# Patient Record
Sex: MALE | Race: WHITE | URBAN - METROPOLITAN AREA
[De-identification: names, ages, dates, MRNs, and addresses within clinical notes are randomized per-mention and may not be internally consistent; named-entity substitution may affect disease eponyms.]

---

## 2018-07-25 ENCOUNTER — TELEPHONE (OUTPATIENT)
Dept: CARDIOLOGY CLINIC | Age: 58
End: 2018-07-25

## 2018-07-25 NOTE — TELEPHONE ENCOUNTER
Called patient to see if he had ever seen [facility] at [facility] (looks like he did in care everywhere ). He said no, his previous cardiologist moved to [facility] and he doesn't remember his name but it wasn't Attari. He is seeing his pcp today and thinks he can get the clearance he needs for his surgery tomorrow. Advised him to call back if he needs an appt . If pcp has question on EKG maybe he could fax it to the office and then speak to the cardiologist on call . Patient agreed and understood .

## 2018-07-25 NOTE — TELEPHONE ENCOUNTER
CARDIAC CLEARANCE     What type of procedure are you having? Right shoulder     Which physician is performing your procedure? grawe    When is your procedure scheduled for? TOMORROW 6 AM    Where are you having this procedure?  [facility]     Are you taking Blood Thinners? If so what? (Name/dose/frequesncy)     Does the surgeon want you to stop your blood thinner? If so for how long? Phone Number and Contact Name for Physicians office:    Fax number to send information:  He is new and wants [facility] or [facility] office .  Has surg tomorrow and needs clearance